# Patient Record
Sex: MALE | Race: WHITE | URBAN - METROPOLITAN AREA
[De-identification: names, ages, dates, MRNs, and addresses within clinical notes are randomized per-mention and may not be internally consistent; named-entity substitution may affect disease eponyms.]

---

## 2017-09-29 ENCOUNTER — HOSPITAL ENCOUNTER (OUTPATIENT)
Dept: SPEECH THERAPY | Facility: CLINIC | Age: 62
Discharge: HOME | End: 2017-09-29
Attending: OTOLARYNGOLOGY | Admitting: OTOLARYNGOLOGY
Payer: COMMERCIAL

## 2017-09-29 ENCOUNTER — HOSPITAL ENCOUNTER (OUTPATIENT)
Dept: GENERAL RADIOLOGY | Facility: CLINIC | Age: 62
Discharge: HOME | End: 2017-09-29
Attending: OTOLARYNGOLOGY | Admitting: OTOLARYNGOLOGY
Payer: COMMERCIAL

## 2017-09-29 DIAGNOSIS — R13.10 DYSPHAGIA, UNSPECIFIED TYPE: ICD-10-CM

## 2017-09-29 PROCEDURE — 74230 X-RAY XM SWLNG FUNCJ C+: CPT

## 2017-09-29 RX ORDER — BARIUM SULFATE 400 MG/ML
SUSPENSION ORAL ONCE
Status: COMPLETED | OUTPATIENT
Start: 2017-09-29 | End: 2017-09-29

## 2017-09-29 RX ADMIN — BARIUM SULFATE: 400 SUSPENSION ORAL at 11:36

## 2017-09-29 NOTE — PROGRESS NOTES
" 09/29/17 1100       Present No   General Information   Type Of Visit Initial   Start Of Care Date 09/29/17   Referring Physician Roe King MD   Orders Evaluate And Treat   Medical Diagnosis dysphagia   Onset Of Illness/injury Or Date Of Surgery 09/12/17  (per MD order date)   Precautions/limitations No Known Precautions/limitations   Hearing Bilateral hearing aids   Pertinent History of Current Problem/OT: Additional Occupational Profile Info Pt is a 62 year old male with past medical hx of multiple open heart surgeries. Pt states he began undergoing aortic valve replacement surgeries in the late 90's. Pt also endorses hx of suspected TIA's over the past ~20 years. Pt states he has always endorsed some s/sx of dysphagia, however he has noticed an increase in symptoms in the past few years. Pt endorses increased coughing with PO, especially on \"flaky\" textures and thin liquids. Pt has hx of x1 PNA in past ~5 years. Pt also reports completing a VFSS in the late 90's which was normal. Video swallow study completed per MD orders to further assess oropharyngeal swallow function.    Respiratory Status Room air   Prior Level Of Function Swallowing   Prior Level Of Function Comment Pt currently eats regular textures and thin liquids   Patient Role/employment History Employed   Living Environment House/[facility]   Patient/family Goals Pt would like to know reason for his chronic cough   Pain Assessment   Pain Reported No   FALL RISK SCREEN   Comments Please see radiology report for further details   Clinical Swallow Evaluation   Oral Musculature generally intact   Structural Abnormalities none present   Dentition present and adequate   Mucosal Quality adequate   Mandibular Strength and Mobility intact   Oral Labial Strength and Mobility WFL   Lingual Strength and Mobility WFL   Velar Elevation intact   Buccal Strength and Mobility intact   Laryngeal Function Cough;Throat clear;Swallow;Voicing " initiated   Oral Musculature Comments WFL   VFSS Evaluation   VFSS Additional Documentation Yes   VFSS Eval: Radiology   Radiologist Dr. Steward   Views Taken left lateral;A/P   Physical Location of Procedure [facility]   VFSS Eval: Thin Liquid Texture Trial   Mode of Presentation, Thin Liquid cup;self-fed   Order of Presentation 1, 2, 5   Preparatory Phase WFL   Oral Phase, Thin Liquid WFL   Pharyngeal Phase, Thin Liquid WFL   Rosenbek's Penetration Aspiration Scale: Thin Liquid Trial Results 1 - no aspiration, contrast does not enter airway   Diagnostic Statement No aspiration or penetration; no evidence of pharyngeal residuals remaining   VFSS Eval: Puree Solid Texture Trial   Mode of Presentation, Puree spoon;self-fed   Order of Presentation 3   Preparatory Phase WFL   Oral Phase, Puree WFL   Pharyngeal Phase, Puree WFL   Rosenbek's Penetration Aspiration Scale: Puree Food Trial Results 1 - no aspiration, contrast does not enter airway   Diagnostic Statement No aspiration/penetration; no pharyngeal residuals remaining   VFSS Eval: Solid Food Texture Trial   Mode of Presentation, Solid self-fed   Order of Presentation 4   Preparatory Phase WFL   Oral Phase, Solid WFL   Pharyngeal Phase, Solid Delayed swallow reflex;Residue in valleculae;Residue in pyriform sinus   Rosenbek's Penetration Aspiration Scale: Solid Food Trial Results 1 - no aspiration, contrast does not enter airway   Diagnostic Statement No aspiration/penetration; mild pharyngeal residuals in valleculae and pyriforms which pt was able to clear independntly with second swallow   FEES Evaluation   Additional Documentation No   Swallow Compensations   Swallow Compensations Alternate viscosity of consistencies;Pacing;Reduce amounts;Multiple swallow   Results No difficulties noted   Educational Assessment   Barriers to Learning No barriers   Preferred Learning Style Listening;Demonstration;Pictures/video   Esophageal Phase of Swallow   Patient reports or  presents with symptoms of esophageal dysphagia No   Esophageal sweep performed during today s vidofluoroscopic exam  Yes;Please refer to radiologist's report for details   General Therapy Interventions   Planned Therapy Interventions Dysphagia Treatment   Dysphagia treatment Compensatory strategies for swallowing;Instruction of safe swallow strategies   Swallow Eval: Clinical Impressions   Skilled Criteria for Therapy Intervention Current level of function same as previous level of function   Dysphagia Outcome Severity Scale (VERENICE) Level 6 - VERENICE   Treatment Diagnosis Oropharyngeal swallow WFL   Diet texture recommendations Regular diet;Thin liquids   Recommended Feeding/Eating Techniques alternate between small bites and sips of food/liquid;hard swallow w/ each bite or sip;maintain upright posture during/after eating for 30 mins;small sips/bites   Rehab Potential good, to achieve stated therapy goals   Demonstrates Need for Referral to Another Service other (see comments)  (pulmonary medicine)   Therapy Frequency other (see comments)  (x1 tx following VFSS)   Anticipated Discharge Disposition home   Risks and Benefits of Treatment have been explained. Yes   Patient, family and/or staff in agreement with Plan of Care Yes   Clinical Impression Comments SLP: Video swallow study completed per MD orders. Pts oropharyngeal swallow is WFL. Pt swallow function characterized by adequate time in oral phase and mildly delayed pharyngeal swallow response. No evidence of aspiration or penetration across any PO trials. Pt noted to have minimal pharyngeal residuals remaining in valleculae and pyriforms following regular solid textures which pt was able to clear with second swallow. Recommend continue regular textures and thin liquids. Pt should be fully upright for all PO, take small single sips/bites, double swallow, alternate between consistenices, and pace self. Pt may benefit from pulmonary consult given ongoing c/o chronic  cough. No further ST needs indicated for dysphagia.    Swallow Goals   SLP Swallow Goals 1   Swallow Goal 1   Goal Identifier LTG   Goal Description Pt will verbalize understanding and agreement with VFSS results and recommendations   Target Date 09/29/17   Date Met 09/29/17   Total Session Time   Total Session Time 30   Total Evaluation Time 20

## 2018-08-29 ENCOUNTER — THERAPY VISIT (OUTPATIENT)
Dept: OCCUPATIONAL THERAPY | Facility: CLINIC | Age: 63
End: 2018-08-29
Payer: MEDICARE

## 2018-08-29 DIAGNOSIS — G24.8 DYSTONIA LENTICULARIS: ICD-10-CM

## 2018-08-29 DIAGNOSIS — M25.642 STIFFNESS OF JOINT, HAND, LEFT: ICD-10-CM

## 2018-08-29 DIAGNOSIS — M79.642 LEFT HAND PAIN: Primary | ICD-10-CM

## 2018-08-29 PROCEDURE — 97110 THERAPEUTIC EXERCISES: CPT | Mod: GO | Performed by: OCCUPATIONAL THERAPIST

## 2018-08-29 PROCEDURE — G8984 CARRY CURRENT STATUS: HCPCS | Mod: GO | Performed by: OCCUPATIONAL THERAPIST

## 2018-08-29 PROCEDURE — 97165 OT EVAL LOW COMPLEX 30 MIN: CPT | Mod: GO | Performed by: OCCUPATIONAL THERAPIST

## 2018-08-29 PROCEDURE — G8985 CARRY GOAL STATUS: HCPCS | Mod: GO | Performed by: OCCUPATIONAL THERAPIST

## 2018-08-29 PROCEDURE — 97112 NEUROMUSCULAR REEDUCATION: CPT | Mod: GO | Performed by: OCCUPATIONAL THERAPIST

## 2018-08-29 NOTE — LETTER
DEPARTMENT OF HEALTH AND HUMAN SERVICES  [facility] & MEDICAID SERVICES    PLAN/UPDATED PLAN OF PROGRESS FOR OUTPATIENT REHABILITATION    PATIENTS NAME:  Samir Cedillo   : 1955  PR[facility] NUMBER:  [id number]  [id number]  PROVIDER NAME: [facility]  MEDICAL RECORD NUMBER: [id number]     START OF CARE DATE:    SOC Date: 18     TYPE:  OT    PRIMARY/TREATMENT DIAGNOSIS: (Pertinent Medical Diagnosis)     Left hand pain  Dystonia lenticularis  Stiffness of joint, hand, left    VISITS FROM START OF CARE:  Rxs Used: 1     Hand Therapy Initial Evaluation  Current Date:  2018    Subjective:  Samir Cedillo is a 63 year old left hand dominant male.    Diagnosis: L hand pain, focal dystonia  DOI:  4-5 mos ago (MD order date 18)    Patient reports symptoms of pain, stiffness/loss of motion and weakness/loss of strength of the left hand which occurred due to unknown cause. Since onset symptoms are unchanged. Special tests:  none.  Previous treatment: universal wrist wrap (helps), injections 2-3 years ago. General health as reported by patient is fair.  Pertinent medical history includes: Concussions/Dizziness, Heart Problems, High Blood Pressure, Implated Device, Osteoarthritis, Seizures.  Medical allergies: none.  Surgical history: heart: 3 AVRs/aortic aneurysm.  Medication history: Heparin/Coumadin, High Blood Pressure.  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  Pt uses a 12 string guitar and does not do any bar chords when playing.  He practices on his couch or otherwise sits in a standard low chair.  Typically plays 1-2 hours straight and will get spasms in his ring and small fingers at times.  Will observe pt playing guitar at next session--requested pt bring guitar.    Pain Level Report: On scale 0-10/10  Date 2018    Side L    Overall 0-1    At Rest 0-1    With Activity 7    Primary Report: location and description  Date 2018    Side L    Location Center of palm, lunate dorsally    Radiation Ringer finger volarly    Pain Quality Spasms, sharp    Frequency Intermittent    Duration When playing guitar    Exacerbated by Playing guitar    Relieved by Rest    Progression since onset Unchanged    Tenderness:  Date 2018    Side L    [place]'s Canal Slight    Ring finger flexor tendon at A1 pulley Slight    Range of Motion Wrist AROM (PROM):  Date 2018   Side L R   Ext WNL WNL   Flex 38 48   UD WNL WNL   RD WNL WNL     Special Tests:  Date 2018    Side L    Edema Moderate in ulnar wrist    MMT ring finger adductor 4/5 MMT    DRUJ Tenderness 5/10 pain    Paresthesias Neg    ULTT ulnar nerve bias ~25%     Ballottement of Lunate on Capitate Mildly tender    PATIENTS NAME:  Samir Cedillo   : 1955    STRENGTH: (Measured in pounds, pain scale 0-10/10)    Date 2018        Trials Left Right Left Right Left Right Left Right Left Right Left Right   1 57 42             2               3               Avg               Pain                 3 Point Pinch  Date 2018        Trials Left Right Left Right Left Right Left Right Left Right Left Right   1 12 14             2               3               Avg               Pain                 Lateral Pinch  Date 2018        Trials Left Right Left Right Left Right Left Right Left Right Left Right   1 14 14              2               3               Avg               Pain                 Assessment/Plan:  Patient presents with symptoms consistent with diagnosis of left hand pain and focal dystonia, with conservative intervention.     Patient's limitations or Problem List includes:  Pain, Increased edema, Weakness, Decreased coordination and Decreased dexterity of the left hand which interferes with the patient's ability to perform Recreational Activities and Household Chores as compared to previous level of function.    Rehab Potential:  Good - Return to full activity, some limitations    Patient will benefit from skilled Occupational Therapy to increase flexibility, overall strength,  strength, pinch strength, coordination and dexterity and decrease pain to return to previous activity level and resume normal daily tasks and to reach their rehab potential.    Barriers to Learning:  No barrier    Communication Issues:  Patient appears to be able to clearly communicate and understand verbal and written communication and follow directions correctly.    Assessment of Occupational Performance:  1-3 Performance Deficits  Identified Performance Deficits: home establishment and management and leisure activities      Clinical Decision Making (Complexity): Low complexity        PATIENTS NAME:  Samir Cedillo   : 1955    Treatment Explanation:  The following has been discussed with the patient:  RX ordered/plan of care  Anticipated outcomes  Possible risks and side effects    P: Frequency:  1 X week, once daily  Duration:  for 12 weeks    Treatment Plan:  Therapeutic Exercise:  AROM, PROM, Tendon Gliding, Blocking, Contract Relax, Isotonics and Isometrics  Neuromuscular re-education:  Nerve Gliding, Coordination/Dexterity, Kinesthetic Training, Proprioceptive Training and Kinesiotaping  Manual Techniques:  Coordination/Dexterity, Joint mobilization and Myofascial release  Orthotic Fabrication:  Static orthosis  Discharge  "Plan:  Achieve all LTG.  Independent in home treatment program.  Reach maximal therapeutic benefit.    Home Exercise Program:  Finger Adduction with sponge or putty  Wrist flexion PROM  Ulnar nerve glide    Next Visit:  Assess guitar playing  MFR  Passive ulnar nerve glides        Caregiver Signature/Credentials _____________________________ Date ________      Treating Provider: Mary Jane Wolf, OTR, CHT      I have reviewed and certified the need for these services and plan of treatment while under my care.        PHYSICIAN'S SIGNATURE:   _____________________________________  Date___________      Madan Millan DO    Certification period: Beginning of Cert date period: 08/29/18 End of Cert period date: 11/26/18     Functional Level Progress Report: Please see attached \"Goal Flow sheet for Functional level.\"    ___X_____ Continue Services or       ________ DC Services                Service dates: SOC Date: 08/29/18  to present                                                                     "

## 2018-08-29 NOTE — PROGRESS NOTES
Hand Therapy Initial Evaluation  Current Date:  8/29/2018    Subjective:  Samir Cedillo is a 63 year old left hand dominant male.    Diagnosis: L hand pain, focal dystonia  DOI:  4-5 mos ago (MD order date 8/28/18)      Patient reports symptoms of pain, stiffness/loss of motion and weakness/loss of strength of the left hand which occurred due to unknown cause. Since onset symptoms are unchanged. Special tests:  none.  Previous treatment: universal wrist wrap (helps), injections 2-3 years ago. General health as reported by patient is fair.  Pertinent medical history includes: Concussions/Dizziness, Heart Problems, High Blood Pressure, Implated Device, Osteoarthritis, Seizures.  Medical allergies: none.  Surgical history: heart: 3 AVRs/aortic aneurysm.  Medication history: Heparin/Coumadin, High Blood Pressure.    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  Pt uses a 12 string guitar and does not do any bar chords when playing.  He practices on his couch or otherwise sits in a standard low chair.  Typically plays 1-2 hours straight and will get spasms in his ring and small fingers at times.  Will observe pt playing guitar at next session--requested pt bring guitar.    Pain Level Report: On scale 0-10/10  Date 8/29/2018    Side L    Overall 0-1    At Rest 0-1    With Activity 7      Primary Report: location and description  Date 8/29/2018    Side L    Location Center of palm, lunate dorsally     Radiation Ringer finger volarly    Pain Quality Spasms, sharp    Frequency Intermittent    Duration When playing guitar    Exacerbated by Playing guitar    Relieved by Rest    Progression since onset Unchanged      Tenderness:  Date 8/29/2018    Side L    Guy[place]'s Canal Slight    Ring finger flexor tendon at A1 pulley Slight      Range of Motion Wrist AROM (PROM):  Date 8/29/2018 8/29/2018   Side L R   Ext WNL WNL   Flex 38 48   UD WNL WNL   RD WNL WNL     Special Tests:  Date 8/29/2018    Side L    Edema Moderate in ulnar wrist    MMT ring finger adductor 4/5 MMT    DRUJ Tenderness 5/10 pain    Paresthesias Neg    ULTT ulnar nerve bias ~25%     Ballottement of Lunate on Capitate Mildly tender      STRENGTH: (Measured in pounds, pain scale 0-10/10)    Date 8/29/2018        Trials Left Right Left Right Left Right Left Right Left Right Left Right   1 57 42             2               3               Avg               Pain                 3 Point Pinch  Date 8/29/2018        Trials Left Right Left Right Left Right Left Right Left Right Left Right   1 12 14             2               3               Avg               Pain                 Lateral Pinch  Date 8/29/2018        Trials Left Right Left Right Left Right Left Right Left Right Left Right   1 14 14             2               3               Avg               Pain                 Assessment/Plan:  Patient presents with symptoms consistent with diagnosis of left hand pain and focal dystonia, with conservative intervention.     Patient's limitations or Problem List includes:  Pain, Increased edema, Weakness, Decreased coordination and Decreased dexterity of the left hand which interferes with the patient's ability to perform Recreational Activities and Household Chores as compared to previous level of function.    Rehab Potential:  Good - Return to full activity, some limitations    Patient will benefit from skilled Occupational Therapy to increase  flexibility, overall strength,  strength, pinch strength, coordination and dexterity and decrease pain to return to previous activity level and resume normal daily tasks and to reach their rehab potential.    Barriers to Learning:  No barrier    Communication Issues:  Patient appears to be able to clearly communicate and understand verbal and written communication and follow directions correctly.    Assessment of Occupational Performance:  1-3 Performance Deficits  Identified Performance Deficits: home establishment and management and leisure activities      Clinical Decision Making (Complexity): Low complexity    Treatment Explanation:  The following has been discussed with the patient:  RX ordered/plan of care  Anticipated outcomes  Possible risks and side effects    P: Frequency:  1 X week, once daily  Duration:  for 12 weeks    Treatment Plan:  Therapeutic Exercise:  AROM, PROM, Tendon Gliding, Blocking, Contract Relax, Isotonics and Isometrics  Neuromuscular re-education:  Nerve Gliding, Coordination/Dexterity, Kinesthetic Training, Proprioceptive Training and Kinesiotaping  Manual Techniques:  Coordination/Dexterity, Joint mobilization and Myofascial release  Orthotic Fabrication:  Static orthosis  Discharge Plan:  Achieve all LTG.  Independent in home treatment program.  Reach maximal therapeutic benefit.    Home Exercise Program:  Finger Adduction with sponge or putty  Wrist flexion PROM  Ulnar nerve glide    Next Visit:  Assess guitar playing  MFR  Passive ulnar nerve glides

## 2018-08-29 NOTE — LETTER
DEPARTMENT OF HEALTH AND HUMAN SERVICES  [facility] FOR MEDICARE & MEDICAID SERVICES    PLAN/UPDATED PLAN OF PROGRESS FOR OUTPATIENT REHABILITATION    PATIENTS NAME:  Samir Cedillo   : 1955  PR[facility] NUMBER:    [id number]  [id number]   PROVIDER NAME: [facility]  MEDICAL RECORD NUMBER: [id number]     START OF CARE DATE:  SOC Date: 18   TYPE:  PT    PRIMARY/TREATMENT DIAGNOSIS: (Pertinent Medical Diagnosis)     Left hand pain  Dystonia lenticularis  Stiffness of joint, hand, left    VISITS FROM START OF CARE:  Rxs Used: 1     Hand Therapy Initial Evaluation  Current Date:  2018    Subjective:  Samir Cedillo is a 63 year old left hand dominant male.    Diagnosis: L hand pain, focal dystonia  DOI:  4-5 mos ago (MD order date 18)    Patient reports symptoms of pain, stiffness/loss of motion and weakness/loss of strength of the left hand which occurred due to unknown cause. Since onset symptoms are unchanged. Special tests:  none.  Previous treatment: universal wrist wrap (helps), injections 2-3 years ago. General health as reported by patient is fair.  Pertinent medical history includes: Concussions/Dizziness, Heart Problems, High Blood Pressure, Implated Device, Osteoarthritis, Seizures.  Medical allergies: none.  Surgical history: heart: 3 AVRs/aortic aneurysm.  Medication history: Heparin/Coumadin, High Blood Pressure.  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  Pt uses a 12 string guitar and does not do any bar chords when playing.  He practices on his couch or otherwise sits in a standard low chair.  Typically plays 1-2 hours straight and will get spasms in his ring and small fingers at times.  Will observe pt playing guitar at next session--requested pt bring guitar.    Pain Level Report: On scale 0-10/10  Date 2018    Side L    Overall 0-1    At Rest 0-1    With Activity 7    Primary Report: location and description  Date 2018    Side L    Location Center of palm, lunate dorsally    Radiation Ringer finger volarly    Pain Quality Spasms, sharp    Frequency Intermittent    Duration When playing guitar    Exacerbated by Playing guitar    Relieved by Rest    Progression since onset Unchanged    Tenderness:  Date 2018    Side L    Guy[place]'s Canal Slight    Ring finger flexor tendon at A1 pulley Slight    Range of Motion Wrist AROM (PROM):  Date 2018   Side L R   Ext WNL WNL   Flex 38 48   UD WNL WNL   RD WNL WNL     Special Tests:  Date 2018    Side L    Edema Moderate in ulnar wrist    MMT ring finger adductor 4/5 MMT    DRUJ Tenderness 5/10 pain    Paresthesias Neg    ULTT ulnar nerve bias ~25%     Ballottement of Lunate on Capitate Mildly tender    PATIENTS NAME:  Samir Cedillo   : 1955    STRENGTH: (Measured in pounds, pain scale 0-10/10)    Date 2018        Trials Left Right Left Right Left Right Left Right Left Right Left Right   1 57 42             2               3               Avg               Pain                 3 Point Pinch  Date 2018        Trials Left Right Left Right Left Right Left Right Left Right Left Right   1 12 14             2               3               Avg               Pain                 Lateral Pinch  Date 2018        Trials Left Right Left Right Left Right Left Right Left Right Left Right   1 14 14              2               3               Avg               Pain                 Assessment/Plan:  Patient presents with symptoms consistent with diagnosis of left hand pain and focal dystonia, with conservative intervention.     Patient's limitations or Problem List includes:  Pain, Increased edema, Weakness, Decreased coordination and Decreased dexterity of the left hand which interferes with the patient's ability to perform Recreational Activities and Household Chores as compared to previous level of function.    Rehab Potential:  Good - Return to full activity, some limitations    Patient will benefit from skilled Occupational Therapy to increase flexibility, overall strength,  strength, pinch strength, coordination and dexterity and decrease pain to return to previous activity level and resume normal daily tasks and to reach their rehab potential.    Barriers to Learning:  No barrier    Communication Issues:  Patient appears to be able to clearly communicate and understand verbal and written communication and follow directions correctly.    Assessment of Occupational Performance:  1-3 Performance Deficits  Identified Performance Deficits: home establishment and management and leisure activities      Clinical Decision Making (Complexity): Low complexity        PATIENTS NAME:  Samir Cedillo   : 1955    Treatment Explanation:  The following has been discussed with the patient:  RX ordered/plan of care  Anticipated outcomes  Possible risks and side effects    P: Frequency:  1 X week, once daily  Duration:  for 12 weeks    Treatment Plan:  Therapeutic Exercise:  AROM, PROM, Tendon Gliding, Blocking, Contract Relax, Isotonics and Isometrics  Neuromuscular re-education:  Nerve Gliding, Coordination/Dexterity, Kinesthetic Training, Proprioceptive Training and Kinesiotaping  Manual Techniques:  Coordination/Dexterity, Joint mobilization and Myofascial release  Orthotic Fabrication:  Static orthosis  Discharge  "Plan:  Achieve all LTG.  Independent in home treatment program.  Reach maximal therapeutic benefit.    Home Exercise Program:  Finger Adduction with sponge or putty  Wrist flexion PROM  Ulnar nerve glide    Next Visit:  Assess guitar playing  MFR  Passive ulnar nerve glides      Caregiver Signature/Credentials _____________________________ Date ________       Art Banda DPT           I have reviewed and certified the need for these services and plan of treatment while under my care.        PHYSICIAN'S SIGNATURE:   ______________________________________ Date___________     Madan Millan DO    Certification period:  Beginning of Cert date period: 08/29/18 to  End of Cert period date: 11/26/18     Functional Level Progress Report: Please see attached \"Goal Flow sheet for Functional level.\"    ____X____ Continue Services or       ________ DC Services                Service dates: From  SOC Date: 08/29/18 date to present                         "

## 2018-09-05 PROBLEM — M25.642 STIFFNESS OF JOINT, HAND, LEFT: Status: ACTIVE | Noted: 2018-09-05

## 2018-09-05 PROBLEM — G24.8: Status: ACTIVE | Noted: 2018-09-05

## 2018-09-05 PROBLEM — M79.642 LEFT HAND PAIN: Status: ACTIVE | Noted: 2018-09-05

## 2018-09-10 ENCOUNTER — THERAPY VISIT (OUTPATIENT)
Dept: OCCUPATIONAL THERAPY | Facility: CLINIC | Age: 63
End: 2018-09-10
Payer: MEDICARE

## 2018-09-10 DIAGNOSIS — G24.8 DYSTONIA LENTICULARIS: ICD-10-CM

## 2018-09-10 DIAGNOSIS — M25.642 STIFFNESS OF JOINT, HAND, LEFT: ICD-10-CM

## 2018-09-10 DIAGNOSIS — M79.642 LEFT HAND PAIN: ICD-10-CM

## 2018-09-10 PROCEDURE — 97112 NEUROMUSCULAR REEDUCATION: CPT | Mod: GO | Performed by: OCCUPATIONAL THERAPIST

## 2018-09-10 PROCEDURE — 97140 MANUAL THERAPY 1/> REGIONS: CPT | Mod: GO | Performed by: OCCUPATIONAL THERAPIST

## 2018-09-10 PROCEDURE — 97110 THERAPEUTIC EXERCISES: CPT | Mod: GO | Performed by: OCCUPATIONAL THERAPIST

## 2018-09-17 ENCOUNTER — THERAPY VISIT (OUTPATIENT)
Dept: OCCUPATIONAL THERAPY | Facility: CLINIC | Age: 63
End: 2018-09-17
Payer: MEDICARE

## 2018-09-17 DIAGNOSIS — G24.8 DYSTONIA LENTICULARIS: ICD-10-CM

## 2018-09-17 DIAGNOSIS — M25.642 STIFFNESS OF JOINT, HAND, LEFT: ICD-10-CM

## 2018-09-17 DIAGNOSIS — M79.642 LEFT HAND PAIN: ICD-10-CM

## 2018-09-17 PROCEDURE — 97110 THERAPEUTIC EXERCISES: CPT | Mod: GO | Performed by: OCCUPATIONAL THERAPIST

## 2018-09-17 PROCEDURE — 97140 MANUAL THERAPY 1/> REGIONS: CPT | Mod: GO | Performed by: OCCUPATIONAL THERAPIST

## 2018-09-24 ENCOUNTER — THERAPY VISIT (OUTPATIENT)
Dept: OCCUPATIONAL THERAPY | Facility: CLINIC | Age: 63
End: 2018-09-24
Payer: MEDICARE

## 2018-09-24 DIAGNOSIS — G24.8 DYSTONIA LENTICULARIS: ICD-10-CM

## 2018-09-24 DIAGNOSIS — M25.642 STIFFNESS OF JOINT, HAND, LEFT: ICD-10-CM

## 2018-09-24 DIAGNOSIS — M79.642 LEFT HAND PAIN: ICD-10-CM

## 2018-09-24 PROCEDURE — 97112 NEUROMUSCULAR REEDUCATION: CPT | Mod: GO | Performed by: OCCUPATIONAL THERAPIST

## 2018-09-24 PROCEDURE — 97140 MANUAL THERAPY 1/> REGIONS: CPT | Mod: GO | Performed by: OCCUPATIONAL THERAPIST

## 2018-09-24 PROCEDURE — 97110 THERAPEUTIC EXERCISES: CPT | Mod: GO | Performed by: OCCUPATIONAL THERAPIST

## 2018-10-08 ENCOUNTER — THERAPY VISIT (OUTPATIENT)
Dept: OCCUPATIONAL THERAPY | Facility: CLINIC | Age: 63
End: 2018-10-08
Payer: MEDICARE

## 2018-10-08 DIAGNOSIS — G24.8 DYSTONIA LENTICULARIS: ICD-10-CM

## 2018-10-08 DIAGNOSIS — M25.642 STIFFNESS OF JOINT, HAND, LEFT: ICD-10-CM

## 2018-10-08 DIAGNOSIS — M79.642 LEFT HAND PAIN: ICD-10-CM

## 2018-10-08 PROCEDURE — 97140 MANUAL THERAPY 1/> REGIONS: CPT | Mod: GO | Performed by: OCCUPATIONAL THERAPIST

## 2018-10-08 PROCEDURE — 97112 NEUROMUSCULAR REEDUCATION: CPT | Mod: GO | Performed by: OCCUPATIONAL THERAPIST

## 2018-10-08 NOTE — LETTER
[facility]  [address]  [address]  [facility] [address]  [phone]    2018    Re: Samir Cedillo   :   1955  MRN:  [id number]   REFERRING PHYSICIAN:   Madan LUJAN  [facility]    Date of Initial Evaluation:  18  Visits:  Rxs Used: 5  Reason for Referral:   Left hand pain, Stiffness of joint, hand, left  Dystonia lenticularis    EVALUATION SUMMARY    Hand Therapy Progress Note  Current Date:  10/8/2018  Reporting Period: 18 to 10/8/2018    Subjective:  Samir Cedillo is a 63 year old left hand dominant male.    Diagnosis: L hand pain, focal dystonia  DOI:  4-5 mos ago (MD order date 18)    S:  Subjective changes as noted by patient: I've had some bad moments with it this last time.  The first time was after practice, after doing lawn and raking, and one other time.     Functional changes noted by patient: able to play guitar with less pain     Response to previous treatment:  Good   Patient has noted adverse reaction to:   None      O:  Guitar: Pt reports pain occurs intermittently with positioning in supination and ulnar deviation.  Pt uses a 12 string guitar and does not do any bar chords when playing.  He practices on his couch or otherwise sits in a standard low chair.  Typically plays 1-2 hours straight and will get spasms in his ring and small fingers at times.  Will observe pt playing guitar at next session--requested pt bring guitar. 10/8/18: Able to play 30-45 min at a time.  No spasms currently.    Pain Level Report: On scale 0-10/10  Date 2018 10/8/18   Side L L   Overall 0-1 0-1   At Rest 0-1 0-1   With Activity 7 4     Re: Samir Cedillo   :   1955        Primary Report: location and description  Date 2018 10/8/18   Side L L   Location Center of palm, lunate dorsally Ulnar and radial wrist   Radiation Ring finger volarly None   Pain Quality Spasms, sharp Achy, sharp   Frequency Intermittent  Intermittent   Duration When playing guitar Playing guitar   Exacerbated by Playing guitar Playing guitar   Relieved by Rest Rest   Progression since onset Unchanged Gradually improving     Tenderness:  Date 2018 10/8/18   Side L L   [place] Slight slight   Ring finger flexor tendon at A1 pulley Slight none     Range of Motion Wrist AROM (PROM):  Date 2018 2018 10/8/18   Side L R R   Ext WNL WNL WNL   Flex 38 48 37   UD WNL WNL WNL   RD WNL WNL WNL     Special Tests:  Date 2018 10/8/18   Side L L   Edema Moderate in ulnar wrist Mild in ulnar wrist   MMT ring finger adductor 4/5 MMT 5/5 MMT   DRUJ Tenderness 5/10 pain 3/10 pain   Paresthesias Neg NT   ULTT ulnar nerve bias ~25%  ~40%   Ballottement of Lunate on Capitate Mildly tender Mildly tender           Re: Samir Cedillo   :   1955        STRENGTH: (Measured in pounds, pain scale 0-10/10)    Date 2018 10/8/18       Trials Left Right Left Right Left Right Left Right Left Right Left Right   1 57 42 65 50           2               3               Avg               Pain                 3 Point Pinch  Date 2018 10/8/18       Trials Left Right Left Right Left Right Left Right Left Right Left Right   1 12 14 12 13           2               3               Avg               Pain                 Lateral Pinch  Date 2018 10/8/18       Trials Left Right Left Right Left Right Left Right Left Right Left Right   1 14 14 16 14           2               3               Avg               Pain                 Assessment:  Response to therapy has been improvement to:  Flexibility:  improved excursion of involved muscles and less tightness in involved muscles  Strength:   and pinch  Pain:  frequency is less, intensity of pain is decreased, duration of pain is decreased and less tender over affected area    Overall Assessment:  Patient's symptoms are resolving.  Patient is ready to progress to more complex  exercises.  Patient is ready to progress to next level of protocol.  Patient is becoming more independent in home exercise program  Patient would benefit from continued therapy to achieve rehab potential  STG/LTG:  STGoals have been reviewed;  see goal sheet for details and updates.  LTGoals have been reviewed;  see goal sheet for details and updates.    I have re-evaluated this patient and find that the nature, scope, duration and intensity of the therapy is appropriate for the medical condition of the patient.      Re: Samir Cedillo   :   1955      P: Frequency:  1 X week, once daily  Duration:  for 4 weeks    Treatment Plan:  Therapeutic Exercise:  AROM, PROM, Tendon Gliding, Blocking, Contract Relax, Isotonics and Isometrics  Neuromuscular re-education:  Nerve Gliding, Coordination/Dexterity, Kinesthetic Training, Proprioceptive Training and Kinesiotaping  Manual Techniques:  Coordination/Dexterity, Joint mobilization and Myofascial release  Orthotic Fabrication:  Static orthosis  Discharge Plan:  Achieve all LTG.  Independent in home treatment program.  Reach maximal therapeutic benefit.    Home Exercise Program:  Finger Adduction with sponge or putty  Wrist flexion PROM  Ulnar nerve glide    Next Visit:  Assess guitar playing  MFR  Passive ulnar nerve glides    Thank you for your referral.    INQUIRIES  Therapist: Mary Jane Wolf, MAYELA, OTR/L, CHT  [facility]  [id number] [place] [address] Drive  [address] [place] [address]  Phone: [phone]  Fax: [phone]

## 2018-10-08 NOTE — PROGRESS NOTES
Hand Therapy Progress Note  Current Date:  10/8/2018  Reporting Period: 8/29/18 to 10/8/2018    Subjective:  Samir Cedillo is a 63 year old left hand dominant male.    Diagnosis: L hand pain, focal dystonia  DOI:  4-5 mos ago (MD order date 8/28/18)    S:  Subjective changes as noted by patient: I've had some bad moments with it this last time.  The first time was after practice, after doing lawn and raking, and one other time.     Functional changes noted by patient: able to play guitar with less pain     Response to previous treatment:  Good   Patient has noted adverse reaction to:   None      O:  Guitar: Pt reports pain occurs intermittently with positioning in supination and ulnar deviation.  Pt uses a 12 string guitar and does not do any bar chords when playing.  He practices on his couch or otherwise sits in a standard low chair.  Typically plays 1-2 hours straight and will get spasms in his ring and small fingers at times.  Will observe pt playing guitar at next session--requested pt bring guitar. 10/8/18: Able to play 30-45 min at a time.  No spasms currently.    Pain Level Report: On scale 0-10/10  Date 8/29/2018 10/8/18   Side L L   Overall 0-1 0-1   At Rest 0-1 0-1   With Activity 7 4     Primary Report: location and description  Date 8/29/2018 10/8/18   Side L L   Location Center of palm, lunate dorsally Ulnar and radial wrist   Radiation Ring finger volarly None   Pain Quality Spasms, sharp Achy, sharp   Frequency Intermittent Intermittent   Duration When playing guitar Playing guitar   Exacerbated by Playing guitar Playing guitar   Relieved by Rest Rest   Progression since onset Unchanged Gradually improving     Tenderness:  Date 8/29/2018 10/8/18   Side L L   Guy[place]'s Canal Slight slight   Ring finger flexor tendon at A1 pulley Slight none     Range of Motion Wrist AROM (PROM):  Date 8/29/2018 8/29/2018 10/8/18   Side L R R   Ext WNL WNL WNL   Flex 38 48 37   UD WNL WNL WNL   RD WNL WNL WNL      Special Tests:  Date 8/29/2018 10/8/18   Side L L   Edema Moderate in ulnar wrist Mild in ulnar wrist   MMT ring finger adductor 4/5 MMT 5/5 MMT   DRUJ Tenderness 5/10 pain 3/10 pain   Paresthesias Neg NT   ULTT ulnar nerve bias ~25%  ~40%   Ballottement of Lunate on Capitate Mildly tender Mildly tender     STRENGTH: (Measured in pounds, pain scale 0-10/10)    Date 8/29/2018 10/8/18       Trials Left Right Left Right Left Right Left Right Left Right Left Right   1 57 42 65 50           2               3               Avg               Pain                 3 Point Pinch  Date 8/29/2018 10/8/18       Trials Left Right Left Right Left Right Left Right Left Right Left Right   1 12 14 12 13           2               3               Avg               Pain                 Lateral Pinch  Date 8/29/2018 10/8/18       Trials Left Right Left Right Left Right Left Right Left Right Left Right   1 14 14 16 14           2               3               Avg               Pain                 Assessment:  Response to therapy has been improvement to:  Flexibility:  improved excursion of involved muscles and less tightness in involved muscles  Strength:   and pinch  Pain:  frequency is less, intensity of pain is decreased, duration of pain is decreased and less tender over affected area    Overall Assessment:  Patient's symptoms are resolving.  Patient is ready to progress to more complex exercises.  Patient is ready to progress to next level of protocol.  Patient is becoming more independent in home exercise program  Patient would benefit from continued therapy to achieve rehab potential  STG/LTG:  STGoals have been reviewed;  see goal sheet for details and updates.  LTGoals have been reviewed;  see goal sheet for details and updates.    I have re-evaluated this patient and find that the nature, scope, duration and intensity of the therapy is appropriate for the medical condition of the patient.    P: Frequency:  1 X week,  once daily  Duration:  for 4 weeks    Treatment Plan:  Therapeutic Exercise:  AROM, PROM, Tendon Gliding, Blocking, Contract Relax, Isotonics and Isometrics  Neuromuscular re-education:  Nerve Gliding, Coordination/Dexterity, Kinesthetic Training, Proprioceptive Training and Kinesiotaping  Manual Techniques:  Coordination/Dexterity, Joint mobilization and Myofascial release  Orthotic Fabrication:  Static orthosis  Discharge Plan:  Achieve all LTG.  Independent in home treatment program.  Reach maximal therapeutic benefit.    Home Exercise Program:  Finger Adduction with sponge or putty  Wrist flexion PROM  Ulnar nerve glide    Next Visit:  Assess guitar playing  MFR  Passive ulnar nerve glides

## 2018-10-25 ENCOUNTER — THERAPY VISIT (OUTPATIENT)
Dept: OCCUPATIONAL THERAPY | Facility: CLINIC | Age: 63
End: 2018-10-25
Payer: MEDICARE

## 2018-10-25 DIAGNOSIS — M79.642 LEFT HAND PAIN: ICD-10-CM

## 2018-10-25 DIAGNOSIS — G24.8 DYSTONIA LENTICULARIS: ICD-10-CM

## 2018-10-25 DIAGNOSIS — M25.642 STIFFNESS OF JOINT, HAND, LEFT: ICD-10-CM

## 2018-10-25 PROCEDURE — 97112 NEUROMUSCULAR REEDUCATION: CPT | Mod: GO | Performed by: OCCUPATIONAL THERAPIST

## 2018-10-25 PROCEDURE — 97140 MANUAL THERAPY 1/> REGIONS: CPT | Mod: GO | Performed by: OCCUPATIONAL THERAPIST

## 2018-11-12 ENCOUNTER — THERAPY VISIT (OUTPATIENT)
Dept: OCCUPATIONAL THERAPY | Facility: CLINIC | Age: 63
End: 2018-11-12
Payer: MEDICARE

## 2018-11-12 DIAGNOSIS — M25.642 STIFFNESS OF JOINT, HAND, LEFT: ICD-10-CM

## 2018-11-12 DIAGNOSIS — M79.642 LEFT HAND PAIN: ICD-10-CM

## 2018-11-12 DIAGNOSIS — G24.8 DYSTONIA LENTICULARIS: ICD-10-CM

## 2018-11-12 PROCEDURE — 97112 NEUROMUSCULAR REEDUCATION: CPT | Mod: GO | Performed by: OCCUPATIONAL THERAPIST

## 2018-11-12 PROCEDURE — 97140 MANUAL THERAPY 1/> REGIONS: CPT | Mod: GO | Performed by: OCCUPATIONAL THERAPIST

## 2018-11-12 NOTE — PROGRESS NOTES
Hand Therapy Progress Note  Current Date:  11/12/2018  Reporting Period: 10/8/18 to 11/12/2018    Subjective:  Samir Cedillo is a 63 year old left hand dominant male.    Diagnosis: L hand pain, focal dystonia  DOI:  4-5 mos ago (MD order date 8/28/18)    S:  Subjective changes as noted by patient:  It's not bad.  I had 2 performance in 1 week.  I got a spasm for the first time a couple of days ago.  Other than that it's been pretty good.  Functional changes noted by patient: able to do more without pain/spasms     Response to previous treatment:  good  Patient has noted adverse reaction to:   None        O:  Guitar: Pt reports pain occurs intermittently with positioning in supination and ulnar deviation.  Pt uses a 12 string guitar and does not do any bar chords when playing.  He practices on his couch or otherwise sits in a standard low chair.  Typically plays 1-2 hours straight and will get spasms in his ring and small fingers at times.  Will observe pt playing guitar at next session--requested pt bring guitar. 10/8/18: Able to play 30-45 min at a time.  No spasms currently. 11/12/18: able to play with 1 spasm    Pain Level Report: On scale 0-10/10  Date 8/29/2018 10/8/18 11/12/18   Side L L L   Overall 0-1 0-1 0-1   At Rest 0-1 0-1 0-1   With Activity 7 4 7     Primary Report: location and description  Date 8/29/2018 10/8/18 11/12/18L   Side L L L   Location Center of palm, lunate dorsally Ulnar and radial wrist Ulnar wrist   Radiation Ring finger volarly None none   Pain Quality Spasms, sharp Achy, sharp Achy, sharp   Frequency Intermittent Intermittent intermittent   Duration When playing guitar Playing guitar Playing guitar   Exacerbated by Playing guitar Playing guitar Playing guitar   Relieved by Rest Rest rest   Progression since onset Unchanged Gradually improving Gradually improving     Tenderness:  Date 8/29/2018 10/8/18 11/12/18   Side L L L   [place]'s Canal Slight slight none   Ring finger flexor  tendon at A1 pulley Slight none none     Range of Motion Wrist AROM (PROM):  Date 8/29/2018 8/29/2018 10/8/18 11/12/18   Side L R L L   Ext WNL WNL WNL NT   Flex 38 48 37 44   UD WNL WNL WNL NT   RD WNL WNL WNL NT     Special Tests:  Date 8/29/2018 10/8/18 11/12/18   Side L L L   Edema Moderate in ulnar wrist Mild in ulnar wrist Mild in ulnar wrist   MMT ring finger adductor 4/5 MMT 5/5 MMT WNL   DRUJ Tenderness 5/10 pain 3/10 pain 1/10 pain   Paresthesias Neg NT NT   ULTT ulnar nerve bias ~25%  ~40% ~60%   Ballottement of Lunate on Capitate Mildly tender Mildly tender none     STRENGTH: (Measured in pounds, pain scale 0-10/10)    Date 8/29/2018 10/8/18       Trials Left Right Left Right Left Right Left Right Left Right Left Right   1 57 42 65 50           2               3               Avg               Pain                 3 Point Pinch  Date 8/29/2018 10/8/18       Trials Left Right Left Right Left Right Left Right Left Right Left Right   1 12 14 12 13           2               3               Avg               Pain                 Lateral Pinch  Date 8/29/2018 10/8/18       Trials Left Right Left Right Left Right Left Right Left Right Left Right   1 14 14 16 14           2               3               Avg               Pain                 Assessment:  Response to therapy has been improvement to:  Flexibility:  improved excursion of involved muscles and less tightness in involved muscles  Strength:   and pinch  Pain:  frequency is less, intensity of pain is decreased, duration of pain is decreased and less tender over affected area    Overall Assessment:  Patient's symptoms are resolving.  Patient is ready to progress to more complex exercises.  Patient is ready to progress to next level of protocol.  Patient is becoming more independent in home exercise program  Patient would benefit from continued therapy to achieve rehab potential  STG/LTG:  STGoals have been reviewed;  see goal sheet for details  and updates.  LTGoals have been reviewed;  see goal sheet for details and updates.    I have re-evaluated this patient and find that the nature, scope, duration and intensity of the therapy is appropriate for the medical condition of the patient.    P: Frequency:  1 X every other week, once daily  Duration:  for 2 weeks    Treatment Plan:  Therapeutic Exercise:  AROM, PROM, Tendon Gliding, Blocking, Contract Relax, Isotonics and Isometrics  Neuromuscular re-education:  Nerve Gliding, Coordination/Dexterity, Kinesthetic Training, Proprioceptive Training and Kinesiotaping  Manual Techniques:  Coordination/Dexterity, Joint mobilization and Myofascial release  Orthotic Fabrication:  Static orthosis  Discharge Plan:  Achieve all LTG.  Independent in home treatment program.  Reach maximal therapeutic benefit.    Home Exercise Program:  Finger Adduction with sponge or putty  Wrist flexion PROM  Ulnar nerve glide    Next Visit:  MFR  Passive ulnar nerve glides

## 2018-11-19 ENCOUNTER — THERAPY VISIT (OUTPATIENT)
Dept: OCCUPATIONAL THERAPY | Facility: CLINIC | Age: 63
End: 2018-11-19
Payer: MEDICARE

## 2018-11-19 DIAGNOSIS — M25.642 STIFFNESS OF JOINT, HAND, LEFT: ICD-10-CM

## 2018-11-19 DIAGNOSIS — G24.8 DYSTONIA LENTICULARIS: ICD-10-CM

## 2018-11-19 DIAGNOSIS — M79.642 LEFT HAND PAIN: ICD-10-CM

## 2018-11-19 PROCEDURE — 97140 MANUAL THERAPY 1/> REGIONS: CPT | Mod: GO | Performed by: OCCUPATIONAL THERAPIST

## 2018-11-19 PROCEDURE — 97110 THERAPEUTIC EXERCISES: CPT | Mod: GO | Performed by: OCCUPATIONAL THERAPIST

## 2018-12-10 PROBLEM — M25.642 STIFFNESS OF JOINT, HAND, LEFT: Status: RESOLVED | Noted: 2018-09-05 | Resolved: 2018-12-10

## 2018-12-10 PROBLEM — G24.8: Status: RESOLVED | Noted: 2018-09-05 | Resolved: 2018-12-10

## 2018-12-10 PROBLEM — M79.642 LEFT HAND PAIN: Status: RESOLVED | Noted: 2018-09-05 | Resolved: 2018-12-10

## 2018-12-21 NOTE — PROGRESS NOTES
Pt has not returned for therapy since 11/19/18.  Assume all goals are met to pt satisfaction.  D/C [facility].

## 2025-05-19 ENCOUNTER — HOSPITAL ENCOUNTER (EMERGENCY)
Facility: CLINIC | Age: 70
Discharge: HOME | End: 2025-05-19
Admitting: EMERGENCY MEDICINE
Payer: MEDICARE

## 2025-05-19 VITALS
DIASTOLIC BLOOD PRESSURE: 87 MMHG | SYSTOLIC BLOOD PRESSURE: 137 MMHG | WEIGHT: 160.94 LBS | RESPIRATION RATE: 19 BRPM | HEIGHT: 62 IN | OXYGEN SATURATION: 97 % | HEART RATE: 77 BPM | TEMPERATURE: 97 F | BODY MASS INDEX: 29.62 KG/M2

## 2025-05-19 PROCEDURE — 99281 EMR DPT VST MAYX REQ PHY/QHP: CPT

## 2025-05-19 ASSESSMENT — COLUMBIA-SUICIDE SEVERITY RATING SCALE - C-SSRS
1. IN THE PAST MONTH, HAVE YOU WISHED YOU WERE DEAD OR WISHED YOU COULD GO TO SLEEP AND NOT WAKE UP?: NO
6. HAVE YOU EVER DONE ANYTHING, STARTED TO DO ANYTHING, OR PREPARED TO DO ANYTHING TO END YOUR LIFE?: NO
2. HAVE YOU ACTUALLY HAD ANY THOUGHTS OF KILLING YOURSELF IN THE PAST MONTH?: NO

## 2025-05-19 ASSESSMENT — ACTIVITIES OF DAILY LIVING (ADL)
ADLS_ACUITY_SCORE: 41
ADLS_ACUITY_SCORE: 41

## 2025-05-19 NOTE — ED TRIAGE NOTES
Pt was sent by his PCP for possible retention.  He denies any abdominal pain, denies frequency/urgency.  He states he does have a smaller than normal stream.     Triage Assessment (Adult)       Row Name 05/19/25 1819          Triage Assessment    Airway WDL WDL        Respiratory WDL    Respiratory WDL WDL        Cardiac WDL    Cardiac WDL WDL